# Patient Record
Sex: FEMALE | Race: BLACK OR AFRICAN AMERICAN | NOT HISPANIC OR LATINO | URBAN - METROPOLITAN AREA
[De-identification: names, ages, dates, MRNs, and addresses within clinical notes are randomized per-mention and may not be internally consistent; named-entity substitution may affect disease eponyms.]

---

## 2023-07-22 ENCOUNTER — HOSPITAL ENCOUNTER (OUTPATIENT)
Dept: DATA CONVERSION | Facility: HOSPITAL | Age: 33
Discharge: HOME | End: 2023-07-22
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
## 2023-07-22 DIAGNOSIS — L53.9 ERYTHEMATOUS CONDITION, UNSPECIFIED: ICD-10-CM

## 2023-07-22 DIAGNOSIS — S93.601A UNSPECIFIED SPRAIN OF RIGHT FOOT, INITIAL ENCOUNTER: Primary | ICD-10-CM

## 2023-07-22 DIAGNOSIS — M79.671 PAIN IN RIGHT FOOT: ICD-10-CM

## 2023-10-24 ENCOUNTER — OFFICE VISIT (OUTPATIENT)
Dept: PRIMARY CARE | Facility: CLINIC | Age: 33
End: 2023-10-24
Payer: COMMERCIAL

## 2023-10-24 VITALS
BODY MASS INDEX: 23.56 KG/M2 | HEIGHT: 64 IN | WEIGHT: 138 LBS | OXYGEN SATURATION: 98 % | DIASTOLIC BLOOD PRESSURE: 64 MMHG | HEART RATE: 73 BPM | TEMPERATURE: 97.1 F | SYSTOLIC BLOOD PRESSURE: 106 MMHG

## 2023-10-24 DIAGNOSIS — Z23 ENCOUNTER FOR IMMUNIZATION: ICD-10-CM

## 2023-10-24 DIAGNOSIS — Z00.00 ROUTINE GENERAL MEDICAL EXAMINATION AT A HEALTH CARE FACILITY: Primary | ICD-10-CM

## 2023-10-24 PROCEDURE — 90686 IIV4 VACC NO PRSV 0.5 ML IM: CPT | Performed by: FAMILY MEDICINE

## 2023-10-24 PROCEDURE — 90471 IMMUNIZATION ADMIN: CPT | Performed by: FAMILY MEDICINE

## 2023-10-24 PROCEDURE — 99395 PREV VISIT EST AGE 18-39: CPT | Performed by: FAMILY MEDICINE

## 2023-10-24 PROCEDURE — 3008F BODY MASS INDEX DOCD: CPT | Performed by: FAMILY MEDICINE

## 2023-10-24 ASSESSMENT — ENCOUNTER SYMPTOMS
HEADACHES: 0
DYSPHORIC MOOD: 0
CONSTIPATION: 0
FEVER: 0
DIARRHEA: 0
DYSURIA: 0
PALPITATIONS: 0
ABDOMINAL PAIN: 0
NERVOUS/ANXIOUS: 0
FREQUENCY: 0
BACK PAIN: 0
RHINORRHEA: 0
FATIGUE: 0
BLOOD IN STOOL: 0
COUGH: 0
SEIZURES: 0
ARTHRALGIAS: 0
SHORTNESS OF BREATH: 0

## 2023-10-24 NOTE — PROGRESS NOTES
"Subjective   Patient ID: Court Villarreal is a 32 y.o. female who presents for Annual Exam (CPE).  Well Adult Physical   Here with mom.  Patient is has cognitive deficits  Patient here for a comprehensive physical exam.The patient reports no problems    Do you take any herbs or supplements that were not prescribed by a doctor? no   Are you taking calcium supplements? no   Are you taking aspirin daily? not applicable     History:  LMP: Patient's last menstrual period was 2023 (approximate).  Last pap date: 2018  Abnormal pap? no  : 0  Para: 0    Never smoker  No alcohol.  Never sexually active.  Not working  Lives with parents.  No concerns about safety.            Review of Systems   Constitutional:  Negative for fatigue and fever.   HENT:  Negative for congestion, hearing loss and rhinorrhea.    Respiratory:  Negative for cough and shortness of breath.    Cardiovascular:  Negative for chest pain and palpitations.   Gastrointestinal:  Negative for abdominal pain, blood in stool, constipation and diarrhea.   Genitourinary:  Negative for dysuria and frequency.   Musculoskeletal:  Negative for arthralgias and back pain.   Skin:  Negative for rash.   Neurological:  Negative for seizures and headaches.   Psychiatric/Behavioral:  Negative for dysphoric mood. The patient is not nervous/anxious.        Objective   /64 (BP Location: Left arm, Patient Position: Sitting, BP Cuff Size: Adult)   Pulse 73   Temp 36.2 °C (97.1 °F) (Temporal)   Ht 1.626 m (5' 4\")   Wt 62.6 kg (138 lb)   LMP 2023 (Approximate)   SpO2 98%   BMI 23.69 kg/m²     Physical Exam  Vitals reviewed.   Constitutional:       General: She is not in acute distress.     Appearance: Normal appearance.   HENT:      Head: Normocephalic and atraumatic.   Eyes:      Pupils: Pupils are equal, round, and reactive to light.   Cardiovascular:      Rate and Rhythm: Normal rate and regular rhythm.      Heart sounds: Normal heart sounds. No " murmur heard.  Pulmonary:      Effort: Pulmonary effort is normal.      Breath sounds: Normal breath sounds.   Abdominal:      General: Bowel sounds are normal.      Palpations: Abdomen is soft. There is no mass.      Tenderness: There is no abdominal tenderness.   Musculoskeletal:      Cervical back: No rigidity.   Lymphadenopathy:      Cervical: No cervical adenopathy.   Skin:     General: Skin is warm and dry.   Neurological:      Mental Status: She is alert. Mental status is at baseline.      Gait: Gait normal.   Psychiatric:         Mood and Affect: Mood normal.         Behavior: Behavior normal.         Thought Content: Thought content normal.         Judgment: Judgment normal.           Assessment/Plan   Problem List Items Addressed This Visit    None  Visit Diagnoses       Routine general medical examination at a health care facility    -  Primary    Relevant Orders    Comprehensive Metabolic Panel    Lipid Panel    TSH with reflex to Free T4 if abnormal    CBC    Body mass index (BMI) 23.0-23.9, adult        Encounter for immunization        Relevant Orders    Flu vaccine (IIV4) age 6 months and greater, preservative free

## 2023-10-24 NOTE — PATIENT INSTRUCTIONS
Immunizations recommended:  Tetanus every 10 years.  Done in 2015,  Gardasil, to reduce risk of genital warts and cervical cancer.--has been done  Flu shot yearly --done today  Covid vaccine    Start pap smear testing at age 21, then every 1-3 years, depending on results.  Yours was normal in 2018, and since never sexually active, not needed as frequently.    Mammogram screening recommendations are changing, but at this time, I recommend a mammogram every 1-2 years in your 40's, and yearly after the age of 50.  Having a family history of breast cancer may change that.    You should try to get 150 minutes of exercise weekly.  Be sure to eat a diet rich in fruits and vegetables, and low in saturated fats and sodium.  Strive for a healthy BMI of less than 25.     Make sure to wear sun screen when outside, and check for changing or unusual moles.    Always wear seat belt.    Specialists being seen:  None    Check fasting blood work some morning.  Fast for 12 hours prior to having blood drawn.  You should drink plenty of water, and can have black tea or black coffee, if you'd like.

## 2024-04-08 ENCOUNTER — TELEPHONE (OUTPATIENT)
Dept: PRIMARY CARE | Facility: CLINIC | Age: 34
End: 2024-04-08
Payer: COMMERCIAL

## 2024-04-08 NOTE — LETTER
Court Villarreal  1990 4/8/2024    To Whom This May Concern:    My patient, Court Villarreal, is unable to perform Jury Duty due to a mental or physical condition that renders the prospective juror unfit for jury service for the remainder of the jury year.    Should you have any questions please do not hesitate to call.    Thank you for your cooperation.    Sincerely,    Arabella Fuentes MD

## 2024-04-08 NOTE — TELEPHONE ENCOUNTER
Patient's mom is calling states that patient is receiving a request for jury duty. Mom would like to know if you could do a letter for her says that she can not participate in jury duty do to her cognitive  disability.

## 2024-11-12 ENCOUNTER — OFFICE VISIT (OUTPATIENT)
Dept: URGENT CARE | Age: 34
End: 2024-11-12
Payer: MEDICARE

## 2024-11-12 VITALS
HEART RATE: 74 BPM | RESPIRATION RATE: 18 BRPM | SYSTOLIC BLOOD PRESSURE: 118 MMHG | DIASTOLIC BLOOD PRESSURE: 75 MMHG | OXYGEN SATURATION: 95 % | TEMPERATURE: 98.2 F

## 2024-11-12 DIAGNOSIS — J06.9 VIRAL URI: Primary | ICD-10-CM

## 2024-11-12 DIAGNOSIS — J02.9 SORE THROAT: ICD-10-CM

## 2024-11-12 DIAGNOSIS — R41.89 COGNITIVE IMPAIRMENT: ICD-10-CM

## 2024-11-12 LAB — POC RAPID STREP: NEGATIVE

## 2024-11-12 ASSESSMENT — ENCOUNTER SYMPTOMS
COUGH: 1
SORE THROAT: 1
DIZZINESS: 1

## 2024-11-12 ASSESSMENT — PATIENT HEALTH QUESTIONNAIRE - PHQ9
SUM OF ALL RESPONSES TO PHQ9 QUESTIONS 1 AND 2: 0
1. LITTLE INTEREST OR PLEASURE IN DOING THINGS: NOT AT ALL
2. FEELING DOWN, DEPRESSED OR HOPELESS: NOT AT ALL

## 2024-11-12 NOTE — PROGRESS NOTES
Subjective   Patient ID: Court Villarreal is a 33 y.o. female. They present today with a chief complaint of Sore Throat, Cough, and Dizziness (X5 days).    History of Present Illness    Sore Throat   Associated symptoms include coughing.   Cough  Associated symptoms include a sore throat.   Dizziness      Patient has history of cognitive disability per mom.  Mom helped provide the history.  Symptoms as noted above for the past 5 days.  Her work, at the , called that she has been appearing worse thus  mom brought her here.  No fever noted.  No home COVID-19 test done.    Mom states she is not on any chronic medications.       Past Medical History  Allergies as of 11/12/2024    (No Known Allergies)       (Not in a hospital admission)       Past Medical History:   Diagnosis Date    Personal history of other specified conditions     History of seizure       No past surgical history on file.     reports that she has never smoked. She has never used smokeless tobacco. She reports that she does not drink alcohol.    Review of Systems  Review of Systems   HENT:  Positive for sore throat.    Respiratory:  Positive for cough.    Neurological:  Positive for dizziness.                                  Objective    Vitals:    11/12/24 1354   BP: 118/75   BP Location: Left arm   Patient Position: Sitting   BP Cuff Size: Adult   Pulse: 74   Resp: 18   Temp: 36.8 °C (98.2 °F)   TempSrc: Oral   SpO2: 95%     No LMP recorded.    Physical Exam    Constitutional: Vital signs reviewed. Well developed and well nourished. Patient alert and without distress.  Quiet, but does respond appropriately when asked a question.  Positive for hoarseness.    Head and Face: Normal, no orbital swelling.     Eyes: Pupils and irises normal. Pink conjunctivae, anicteric sclerae. No conjunctival injection.    Ears, Nose, Mouth and Throat: External inspection of ears: Normal. Otoscopic exam: Normal. Nasal Mucosa, septum and turbinates: Abnormal  +rhinorrhea, +mildly swollen turbinates. Oropharynx: +postnasal drainage.  Normal tonsils otherwise.  No drooling or trismus.    Neck: No neck mass observed. Supple.    Cardiovascular: Heart rate normal, normal S1 and S2, no gallops, no murmurs and no pericardial rub. Rhythm: Normal. No peripheral edema.    Pulmonary: No respiratory distress. Clear breath sounds.    Neurologic: Cortical function: Normal    Lymphatic: No cervical lymphadenopathy      Procedures    Point of Care Test & Imaging Results from this visit  Results for orders placed or performed in visit on 11/12/24   POCT rapid strep A manually resulted   Result Value Ref Range    POC Rapid Strep Negative Negative      No results found.    Diagnostic study results (if any) were reviewed by Cassandra Gee.    Assessment/Plan   Allergies, medications, history, and pertinent labs/EKGs/Imaging reviewed by Cassandra Gee.     Medical Decision Making  Recommended COVID-19 testing, mom opted to do it at home.  Instructed to call here if positive, if considering taking Paxlovid.    Orders and Diagnoses  Diagnoses and all orders for this visit:  Viral URI  Sore throat  -     POCT rapid strep A manually resulted  Cognitive impairment      Medical Admin Record      Patient disposition: Home    Electronically signed by Cassandra Gee  3:03 PM

## 2024-11-12 NOTE — PATIENT INSTRUCTIONS
Go to the emergency department for severe symptoms.    Follow-up with primary care as needed.  As discussed, please do a COVID-19 home test today.    Tea, with small amount of fresh ginger, honey and lemon to taste for the hoarseness.    Plain saline nasal spray, drops or wash every 2-4 hours to rinse the nasal passages followed with warm saltwater or mouthwash gargles.    Acetaminophen 500 mg (Extra StrengthTylenol), 2 tablets every 6 hours as needed for fever or pain.

## 2024-11-12 NOTE — LETTER
November 12, 2024     Patient: Court Villarreal   YOB: 1990   Date of Visit: 11/12/2024       To Whom It May Concern:    Court Villarreal was seen in my clinic on 11/12/2024 at 1:00 pm. Please excuse Court for her absence from work on this day to make the appointment. May return 11/14/2024    If you have any questions or concerns, please don't hesitate to call.         Sincerely, Rina Gee        CC: No Recipients

## 2024-11-26 ENCOUNTER — APPOINTMENT (OUTPATIENT)
Dept: PRIMARY CARE | Facility: CLINIC | Age: 34
End: 2024-11-26
Payer: COMMERCIAL

## 2024-11-26 VITALS
DIASTOLIC BLOOD PRESSURE: 60 MMHG | SYSTOLIC BLOOD PRESSURE: 116 MMHG | WEIGHT: 129.2 LBS | HEART RATE: 73 BPM | HEIGHT: 64 IN | TEMPERATURE: 97.7 F | BODY MASS INDEX: 22.06 KG/M2 | OXYGEN SATURATION: 92 %

## 2024-11-26 DIAGNOSIS — J40 BRONCHITIS: Primary | ICD-10-CM

## 2024-11-26 PROCEDURE — 3008F BODY MASS INDEX DOCD: CPT | Performed by: FAMILY MEDICINE

## 2024-11-26 PROCEDURE — 99213 OFFICE O/P EST LOW 20 MIN: CPT | Performed by: FAMILY MEDICINE

## 2024-11-26 PROCEDURE — 1036F TOBACCO NON-USER: CPT | Performed by: FAMILY MEDICINE

## 2024-11-26 RX ORDER — DOXYCYCLINE 100 MG/1
100 CAPSULE ORAL 2 TIMES DAILY
Qty: 20 CAPSULE | Refills: 0 | Status: SHIPPED | OUTPATIENT
Start: 2024-11-26 | End: 2024-12-06

## 2024-11-26 NOTE — PATIENT INSTRUCTIONS
Bronchitis, most likely viral.  Since it is improving, you can continue to treat the symptoms as needed with Dayquil.  If it is getting worse, or persisting for another week, fill the antibiotic, doxycycline.    Additional options for symptoms :   Cough medicine with guaifenesin, such as Mucinex or Robitussin.  Tylenol for body aches or fever. Ibuprofen can also be used if no contraindication.  If you don't have high blood pressure, you could try a decongestant with pseudoephedrine. Or Coricidin HBP if you have high blood pressure.  Salt water nose spray.     Call if you're not improving or getting worse.

## 2024-11-26 NOTE — PROGRESS NOTES
"Subjective   Patient ID: Court Villarreal is a 33 y.o. female who presents for Cough (Onset: 2 weeks ago//Have your symptoms gotten better or worse: better//Other sx:/Runny or stuffy nose/Sneezing/Fatigue) and Raspy voice.  Has been sick for a couple weeks, starting to get better, but wanted to be checked.  Had gone to  in the beginning, and lungs were clear,  no meds given.  No CXR done.  Has been using Dayquil which helps the symptoms.  Energy is low.  No fever.  No facial pain, but is having RN, congestion, raspy voice.  Coughs, but no mucous production.  No SOB or wheezing.  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        Review of Systems    Objective   /60 (BP Location: Right arm, Patient Position: Sitting, BP Cuff Size: Adult)   Pulse 73   Temp 36.5 °C (97.7 °F) (Temporal)   Ht 1.626 m (5' 4\")   Wt 58.6 kg (129 lb 3.2 oz)   SpO2 92%   BMI 22.18 kg/m²     Physical Exam  Vitals reviewed.   Constitutional:       Appearance: Normal appearance.   HENT:      Right Ear: Tympanic membrane and ear canal normal.      Left Ear: Tympanic membrane and ear canal normal.      Nose: No congestion or rhinorrhea.      Right Turbinates: Swollen.      Left Turbinates: Swollen.      Mouth/Throat:      Mouth: Mucous membranes are moist.      Pharynx: No oropharyngeal exudate or posterior oropharyngeal erythema.   Cardiovascular:      Rate and Rhythm: Normal rate and regular rhythm.   Pulmonary:      Effort: Pulmonary effort is normal.      Breath sounds: Normal breath sounds.   Lymphadenopathy:      Cervical: No cervical adenopathy.   Neurological:      Mental Status: She is alert.           Assessment/Plan   Problem List Items Addressed This Visit    None  Visit Diagnoses       Bronchitis    -  Primary    Relevant Medications    doxycycline (Vibramycin) 100 mg capsule               "

## 2025-03-24 ENCOUNTER — APPOINTMENT (OUTPATIENT)
Dept: PRIMARY CARE | Facility: CLINIC | Age: 35
End: 2025-03-24
Payer: COMMERCIAL

## 2025-03-24 VITALS
HEART RATE: 77 BPM | BODY MASS INDEX: 21.78 KG/M2 | SYSTOLIC BLOOD PRESSURE: 92 MMHG | OXYGEN SATURATION: 98 % | DIASTOLIC BLOOD PRESSURE: 60 MMHG | TEMPERATURE: 97.4 F | HEIGHT: 64 IN | WEIGHT: 127.6 LBS

## 2025-03-24 DIAGNOSIS — Z00.00 ROUTINE GENERAL MEDICAL EXAMINATION AT A HEALTH CARE FACILITY: Primary | ICD-10-CM

## 2025-03-24 DIAGNOSIS — Z23 IMMUNIZATION DUE: ICD-10-CM

## 2025-03-24 DIAGNOSIS — L68.0 HIRSUTISM: ICD-10-CM

## 2025-03-24 DIAGNOSIS — L70.0 ACNE VULGARIS: ICD-10-CM

## 2025-03-24 PROCEDURE — 3008F BODY MASS INDEX DOCD: CPT | Performed by: FAMILY MEDICINE

## 2025-03-24 PROCEDURE — 1036F TOBACCO NON-USER: CPT | Performed by: FAMILY MEDICINE

## 2025-03-24 PROCEDURE — 99395 PREV VISIT EST AGE 18-39: CPT | Performed by: FAMILY MEDICINE

## 2025-03-24 PROCEDURE — 90471 IMMUNIZATION ADMIN: CPT | Performed by: FAMILY MEDICINE

## 2025-03-24 PROCEDURE — 90715 TDAP VACCINE 7 YRS/> IM: CPT | Performed by: FAMILY MEDICINE

## 2025-03-24 RX ORDER — ADAPALENE AND BENZOYL PEROXIDE GEL, 0.1%/2.5% 1; 25 MG/G; MG/G
1 GEL TOPICAL NIGHTLY
Qty: 45 G | Refills: 0 | Status: SHIPPED | OUTPATIENT
Start: 2025-03-24

## 2025-03-24 ASSESSMENT — ENCOUNTER SYMPTOMS
OCCASIONAL FEELINGS OF UNSTEADINESS: 0
ABDOMINAL PAIN: 0
NERVOUS/ANXIOUS: 0
ROS SKIN COMMENTS: ACNE ON FACE
FEVER: 0
FREQUENCY: 0
PALPITATIONS: 0
SHORTNESS OF BREATH: 0
DYSURIA: 0
COUGH: 0
BLOOD IN STOOL: 0
BACK PAIN: 0
CONSTIPATION: 0
RHINORRHEA: 0
LOSS OF SENSATION IN FEET: 0
HEADACHES: 0
ARTHRALGIAS: 0
DEPRESSION: 0
DYSPHORIC MOOD: 0
FATIGUE: 0
DIARRHEA: 0

## 2025-03-24 ASSESSMENT — PATIENT HEALTH QUESTIONNAIRE - PHQ9
1. LITTLE INTEREST OR PLEASURE IN DOING THINGS: NOT AT ALL
SUM OF ALL RESPONSES TO PHQ9 QUESTIONS 1 AND 2: 0
2. FEELING DOWN, DEPRESSED OR HOPELESS: NOT AT ALL

## 2025-03-24 NOTE — PROGRESS NOTES
"Subjective   Patient ID: Court Villarreal is a 34 y.o. female who presents for Annual Exam (Check up).  Well Adult Physical   Patient here for a comprehensive physical exam.     History:  LMP: monthly, last about 7 days.  Last pap date: 2018  Abnormal pap? no  : 0  Para: 0    Feels well.  Only  complaints are hair on face and acne.  Had been checked for PCO in 2018 with Dr. Crawley.                Review of Systems   Constitutional:  Negative for fatigue and fever.   HENT:  Negative for congestion, hearing loss and rhinorrhea.    Respiratory:  Negative for cough and shortness of breath.    Cardiovascular:  Negative for chest pain and palpitations.   Gastrointestinal:  Negative for abdominal pain, blood in stool, constipation and diarrhea.   Genitourinary:  Negative for dysuria and frequency.   Musculoskeletal:  Negative for arthralgias and back pain.   Skin:         Acne on face   Neurological:  Negative for headaches.   Psychiatric/Behavioral:  Negative for dysphoric mood. The patient is not nervous/anxious.        Objective   BP 92/60 (BP Location: Right arm, Patient Position: Sitting, BP Cuff Size: Adult)   Pulse 77   Temp 36.3 °C (97.4 °F) (Temporal)   Ht 1.626 m (5' 4\")   Wt 57.9 kg (127 lb 9.6 oz)   SpO2 98%   BMI 21.90 kg/m²     Physical Exam  Vitals reviewed.   Constitutional:       General: She is not in acute distress.     Appearance: Normal appearance.   HENT:      Head: Normocephalic and atraumatic.   Eyes:      Pupils: Pupils are equal, round, and reactive to light.   Cardiovascular:      Rate and Rhythm: Normal rate and regular rhythm.      Heart sounds: Normal heart sounds. No murmur heard.  Pulmonary:      Effort: Pulmonary effort is normal.      Breath sounds: Normal breath sounds.   Abdominal:      General: Bowel sounds are normal.      Palpations: Abdomen is soft. There is no mass.      Tenderness: There is no abdominal tenderness.   Musculoskeletal:      Cervical back: No rigidity. "   Lymphadenopathy:      Cervical: No cervical adenopathy.   Skin:     General: Skin is warm and dry.      Comments: Hair noted on lower part of face, chest, and abdomen.   Neurological:      Mental Status: She is alert. Mental status is at baseline.      Gait: Gait normal.   Psychiatric:         Mood and Affect: Mood normal.         Behavior: Behavior normal.         Thought Content: Thought content normal.         Judgment: Judgment normal.           Assessment/Plan   Problem List Items Addressed This Visit    None  Visit Diagnoses       Routine general medical examination at a health care facility    -  Primary    Relevant Orders    Comprehensive Metabolic Panel    Lipid Panel    TSH with reflex to Free T4 if abnormal    CBC    Testosterone,Free and Total    Follicle Stimulating Hormone    Luteinizing hormone    DHEA-Sulfate    Immunization due        Relevant Orders    Tdap vaccine, age 7 years and older    Hirsutism        Relevant Medications    eflornithine  cream (Vaniqa) 13.9 % cream    Other Relevant Orders    TSH with reflex to Free T4 if abnormal    Testosterone,Free and Total    Follicle Stimulating Hormone    Luteinizing hormone    DHEA-Sulfate    Acne vulgaris        Relevant Medications    adapalene-benzoyl peroxide 0.1-2.5 % gel    Body mass index (BMI) of 21.0 to 21.9 in adult

## 2025-03-24 NOTE — PATIENT INSTRUCTIONS
Immunizations recommended:  Tetanus every 10 years.  Done in 2015, so updated today.  Gardasil, to reduce risk of genital warts and cervical cancer.--has been done  Flu shot yearly  Covid vaccine    Start pap smear testing at age 21, then every 1-3 years, depending on results.  Yours was normal in 2018, and since never sexually active, not needed as frequently.  Plan to follow up with Dr. Crawley this year to have it done.    Mammogram screening recommendations are changing, but at this time, I recommend a mammogram every 1-2 years in your 40's, and yearly after the age of 50.  Having a family history of breast cancer may change that.    You should try to get 150 minutes of exercise weekly.  Be sure to eat a diet rich in fruits and vegetables, and low in saturated fats and sodium.    Make sure to wear sun screen when outside, and check for changing or unusual moles.    Always wear seat belt.    Specialists being seen:  None    Check fasting blood work this AM.  Will include hormone levels related to hair growth on face and trunk.    For hair on face, will try Vaniqa cream twice a day. (Insurance may not cover)  For acne, epi-duo can be used nightly.  Separate the medications by several hours.       Pt requesting refill    omeprazole (PRILOSEC) 40 MG delayed release capsule     David in [facility]

## 2025-03-29 LAB
ALBUMIN SERPL-MCNC: 4.4 G/DL (ref 3.6–5.1)
ALP SERPL-CCNC: 65 U/L (ref 31–125)
ALT SERPL-CCNC: 15 U/L (ref 6–29)
ANION GAP SERPL CALCULATED.4IONS-SCNC: 6 MMOL/L (CALC) (ref 7–17)
AST SERPL-CCNC: 16 U/L (ref 10–30)
BILIRUB SERPL-MCNC: 0.5 MG/DL (ref 0.2–1.2)
BUN SERPL-MCNC: 16 MG/DL (ref 7–25)
CALCIUM SERPL-MCNC: 9.3 MG/DL (ref 8.6–10.2)
CHLORIDE SERPL-SCNC: 104 MMOL/L (ref 98–110)
CHOLEST SERPL-MCNC: 159 MG/DL
CHOLEST/HDLC SERPL: 2.2 (CALC)
CO2 SERPL-SCNC: 27 MMOL/L (ref 20–32)
CREAT SERPL-MCNC: 0.88 MG/DL (ref 0.5–0.97)
DHEA-S SERPL-MCNC: 328 MCG/DL (ref 19–237)
EGFRCR SERPLBLD CKD-EPI 2021: 89 ML/MIN/1.73M2
ERYTHROCYTE [DISTWIDTH] IN BLOOD BY AUTOMATED COUNT: 13.1 % (ref 11–15)
FSH SERPL-ACNC: 2.7 MIU/ML
GLUCOSE SERPL-MCNC: 72 MG/DL (ref 65–99)
HCT VFR BLD AUTO: 40.2 % (ref 35–45)
HDLC SERPL-MCNC: 71 MG/DL
HGB BLD-MCNC: 13.7 G/DL (ref 11.7–15.5)
LDLC SERPL CALC-MCNC: 75 MG/DL (CALC)
LH SERPL-ACNC: 6.3 MIU/ML
MCH RBC QN AUTO: 30.9 PG (ref 27–33)
MCHC RBC AUTO-ENTMCNC: 34.1 G/DL (ref 32–36)
MCV RBC AUTO: 90.7 FL (ref 80–100)
NONHDLC SERPL-MCNC: 88 MG/DL (CALC)
PLATELET # BLD AUTO: 254 THOUSAND/UL (ref 140–400)
PMV BLD REES-ECKER: 11 FL (ref 7.5–12.5)
POTASSIUM SERPL-SCNC: 4.3 MMOL/L (ref 3.5–5.3)
PROT SERPL-MCNC: 7.7 G/DL (ref 6.1–8.1)
RBC # BLD AUTO: 4.43 MILLION/UL (ref 3.8–5.1)
SODIUM SERPL-SCNC: 137 MMOL/L (ref 135–146)
TESTOST FREE SERPL-MCNC: 4.9 PG/ML (ref 0.1–6.4)
TESTOST SERPL-MCNC: 27 NG/DL (ref 2–45)
TRIGL SERPL-MCNC: 47 MG/DL
TSH SERPL-ACNC: 1.04 MIU/L
WBC # BLD AUTO: 4.9 THOUSAND/UL (ref 3.8–10.8)

## 2025-06-16 ENCOUNTER — OFFICE VISIT (OUTPATIENT)
Dept: URGENT CARE | Age: 35
End: 2025-06-16
Payer: MEDICARE

## 2025-06-16 VITALS
DIASTOLIC BLOOD PRESSURE: 78 MMHG | BODY MASS INDEX: 22.31 KG/M2 | TEMPERATURE: 98.2 F | OXYGEN SATURATION: 96 % | SYSTOLIC BLOOD PRESSURE: 116 MMHG | RESPIRATION RATE: 18 BRPM | HEART RATE: 79 BPM | WEIGHT: 130 LBS

## 2025-06-16 DIAGNOSIS — L02.421 FURUNCLE OF RIGHT AXILLA: Primary | ICD-10-CM

## 2025-06-16 RX ORDER — SULFAMETHOXAZOLE AND TRIMETHOPRIM 800; 160 MG/1; MG/1
1 TABLET ORAL 2 TIMES DAILY
Qty: 14 TABLET | Refills: 0 | Status: SHIPPED | OUTPATIENT
Start: 2025-06-16

## 2025-06-16 RX ORDER — MUPIROCIN 20 MG/G
OINTMENT TOPICAL
Qty: 22 G | Refills: 0 | Status: SHIPPED | OUTPATIENT
Start: 2025-06-16 | End: 2025-06-26

## 2025-06-16 ASSESSMENT — VISUAL ACUITY: OU: 1

## 2025-06-16 ASSESSMENT — PAIN SCALES - GENERAL: PAINLEVEL_OUTOF10: 8

## 2025-06-16 NOTE — LETTER
June 16, 2025     Patient: Court Villarreal   YOB: 1990   Date of Visit: 6/16/2025       To Whom It May Concern:    Court Villarreal was seen in my clinic on 6/16/2025 at 8:30 am. Please excuse Court for her absence from work on this day to make the appointment. Court may return to work on 6/17/2025.    If you have any questions or concerns, please don't hesitate to call.         Sincerely,         Joslyn Morales, APRN-CNP        CC: No Recipients  
Yes, the patient is being discharged from [facility]...

## 2025-06-16 NOTE — PROGRESS NOTES
Subjective   Patient ID: Court Villarreal is a 34 y.o. female. They present today with a chief complaint of boil under arm (Boil under Rt armpit).    History of Present Illness  Patient is a 33yo female who presents with her caregiver with a boil under her right armpit x 6 days. 8/10 pain.      History provided by:  Caregiver  History limited by:  Mental status change   used: No        Past Medical History  Allergies as of 06/16/2025    (No Known Allergies)       Prescriptions Prior to Admission[1]     Medical History[2]    Surgical History[3]     reports that she has never smoked. She has never used smokeless tobacco. She reports that she does not drink alcohol and does not use drugs.    Review of Systems  Review of Systems                               Objective    Vitals:    06/16/25 0905   BP: 116/78   Pulse: 79   Resp: 18   Temp: 36.8 °C (98.2 °F)   SpO2: 96%   Weight: 59 kg (130 lb)     Patient's last menstrual period was 06/09/2025 (exact date).    Physical Exam  Vitals reviewed.   Constitutional:       General: She is awake. She is not in acute distress.     Appearance: Normal appearance. She is well-developed, well-groomed and normal weight. She is not ill-appearing.   HENT:      Head: Normocephalic and atraumatic.      Right Ear: Hearing and external ear normal.      Left Ear: Hearing and external ear normal.      Nose: Nose normal. No nasal deformity or signs of injury.      Mouth/Throat:      Lips: Pink.   Eyes:      General: Lids are normal. Vision grossly intact.   Cardiovascular:      Rate and Rhythm: Normal rate and regular rhythm.      Heart sounds: Normal heart sounds, S1 normal and S2 normal. Heart sounds not distant. No murmur heard.  Pulmonary:      Effort: Pulmonary effort is normal. No tachypnea, bradypnea, accessory muscle usage, prolonged expiration, respiratory distress or retractions.      Breath sounds: Normal breath sounds and air entry. No stridor, decreased air  movement or transmitted upper airway sounds. No decreased breath sounds.   Chest:      Chest wall: No deformity.   Skin:     General: Skin is warm and dry.      Capillary Refill: Capillary refill takes less than 2 seconds.      Findings: Abscess and erythema present.          Neurological:      General: No focal deficit present.      Mental Status: She is alert.      Gait: Gait is intact.   Psychiatric:         Attention and Perception: Attention and perception normal.         Mood and Affect: Mood and affect normal.         Speech: Speech normal.         Behavior: Behavior normal. Behavior is cooperative.         Procedures    Point of Care Test & Imaging Results from this visit  No results found for this visit on 06/16/25.   Imaging  No results found.    Cardiology, Vascular, and Other Imaging  No other imaging results found for the past 2 days      Diagnostic study results (if any) were reviewed by LATRICIA Nicholas.    Assessment/Plan   Allergies, medications, history, and pertinent labs/EKGs/Imaging reviewed by LATRICIA Nicholas.     Medical Decision Making  Patient has boil under right axillary. Patient declined I&D at this time. Will treat with oral and topical abx. Advised no deodorant and no shaving until resolution.     Risks, benefits, and alternatives of the medications and treatment plan prescribed today were discussed, and the parent expressed understanding. Plan follow up as discussed or as needed if any worsening symptoms or change in condition. Reinforced red flags including (but not limited to): severe or worsening abdominal pain; difficulty swallowing; stiff neck; shortness of breath; coughing or vomiting blood; chest pain; and new or increased fever are indications to go to the Emergency Department.    The parent voices understanding of all medications. No barriers to adherence. Patient is taking all medications as prescribed and tolerating well. For any new  medications, the parent was instructed of directions for and consequences of not taking medication and they were informed about the potential side effects and drug interactions. The after-visit summary was given to the parent and care instructions were reviewed with the parent. All questions were answered and the parent verbalized understanding of the plan of care for today.    Orders and Diagnoses  There are no diagnoses linked to this encounter.    Medical Admin Record      Patient disposition: Home    Electronically signed by LATRICIA Nicholas  9:41 AM           [1] (Not in a hospital admission)  [2]   Past Medical History:  Diagnosis Date    Personal history of other specified conditions     History of seizure   [3] History reviewed. No pertinent surgical history.

## 2026-03-25 ENCOUNTER — APPOINTMENT (OUTPATIENT)
Dept: PRIMARY CARE | Facility: CLINIC | Age: 36
End: 2026-03-25
Payer: COMMERCIAL